# Patient Record
(demographics unavailable — no encounter records)

---

## 2025-02-12 NOTE — PHYSICAL EXAM
[de-identified] : Hand/wrist/finger (right)  Inspection  Swelling: none  Ecchymosis: none  Scissoring: none    Palpation  Tenderness: Moderate Location: Snuffbox, dorsum of the wrist  Wrist Flexion  Normal.  Wrist Extension  Normal.  Wrist Radial Deviation  Normal.  Wrist Ulnar Deviation  Normal.   Wrist/Pinch/ Motor Strength  Wrist Extension: 4/5, painful Wrist Flexion: 5/5  : 4/5   Finger Flexion  Normal.  Painful with first digit extension Finger Extension  Normal.  Sensory index  Normal   Special Tests   Finkelsteins: normal  Tinnels: normal  Phalens: normal  Pinch : normal  [de-identified] : ACC: [id number] EXAM: XR HAND MIN 3 VIEWS RT ORDERED BY: LEANN KAMARA ACC: [id number] EXAM: XR WRIST COMP MIN 3 VIEWS RT ORDERED BY: LEANN KAMARA ACC: [id number] EXAM: XR FOREARM 2 VIEWS RT ORDERED BY: LEANN KAMARA  PROCEDURE DATE: 02/03/2025  INTERPRETATION: XR FOREARM 2 VIEWS RIGHT, XR WRIST COMPLETE 3 VIEWS RIGHT, XR HAND 3 VIEWS RIGHT  INDICATION: r/o fx.  TECHNIQUE: AP, oblique and lateral views of the right hand and right wrist and AP and lateral views of the right forearm were obtained.  FINDINGS: There is no evidence of fracture or dislocation. The carpal bones are normally aligned. There is no soft tissue abnormality.  IMPRESSION: No fracture of the right hand, right wrist or right forearm  These images were personally reviewed with original findings documented as above.

## 2025-02-12 NOTE — DISCUSSION/SUMMARY
[de-identified] : LISSY HODGES is a 49 year old Right-hand-dominant female presenting with Fall on outstretched hand of the right hand resulting in possible occult scaphoid fracture versus other internal derangement.  Initial x-rays negative.  Plan: 1.  Thumb spica splint prescribed in office 2.  Diclofenac 75 mg prescribed 3.  MRI of right wrist to evaluate for occult scaphoid fracture or other internal derangement ordered 4.  Patient will remain out of work for the next 1 to 2 weeks until follow-up after MRI results and update on her status will be provided.

## 2025-02-12 NOTE — HISTORY OF PRESENT ILLNESS
[de-identified] : LISSY HODGES is a 49 year old female presenting with Acute right sided wrist pain status post mechanical fall on outstretched hand.  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  She is right-hand dominant.  Afterwards patient noted some moderate to significant pain when she went to try and use the hand on the mouse at work.  She then went to get this evaluated and had x-rays done that were negative for any fracture and was given a neutral wrist brace which has not been very helpful.  Has been taking Motrin at night which is not very helpful either.  Pain is most significant at the base of the thumb and across the back of the wrist and is here today for further evaluation.

## 2025-02-26 NOTE — HISTORY OF PRESENT ILLNESS
[de-identified] : LISSY HODGES is a 49 year old female presenting with Acute right sided wrist pain status post mechanical fall on outstretched hand.  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  She is right-hand dominant.  Afterwards patient noted some moderate to significant pain when she went to try and use the hand on the mouse at work.  She then went to get this evaluated and had x-rays done that were negative for any fracture and was given a neutral wrist brace which has not been very helpful.  Has been taking Motrin at night which is not very helpful either.  Pain is most significant at the base of the thumb and across the back of the wrist and is here today for further evaluation.  Interval history: Patient has been using the thumb spica brace for 2 weeks since last visit.  States she is still having moderate discomfort and difficulty using the right hand mostly with pain at the base of the thumb.  Here today to discuss MRI results.

## 2025-02-26 NOTE — PHYSICAL EXAM
[de-identified] : Hand/wrist/finger (right)  Inspection  Swelling: none  Ecchymosis: none  Scissoring: none    Palpation  Tenderness: Moderate Location: Base of the first digit at the MCP and CMC joint as well as over the first dorsal compartment.  Wrist Flexion  Normal.  Wrist Extension  Normal.  Wrist Radial Deviation  Normal.  Wrist Ulnar Deviation  Normal.   Wrist/Pinch/ Motor Strength  Wrist Extension: 4/5, painful Wrist Flexion: 5/5  : 4/5   Finger Flexion  Normal.  Painful with first digit extension Finger Extension  Normal.  Sensory index  Normal   Special Tests   Finkelsteins: Positive Tinnels: normal  Phalens: normal  Pinch : normal  [de-identified] : MRI of right wrist Date: 2/13/2025  Performed at [facility]: No, [place] [facility] Impression:   1.  Joint effusion 2.  No evidence of tendinopathy ligamentous injury or fracture.  These images were personally reviewed with original findings documented as above.

## 2025-02-26 NOTE — DISCUSSION/SUMMARY
[de-identified] : LSISY HODGES is a 49 year old Right-hand-dominant female presenting with Fall on outstretched hand of the right hand resulting in possible occult scaphoid fracture versus other internal derangement.  Initial x-rays negative.  Discussed MRI results with patient that were negative for any fracture or dislocation however did show a contusion and effusion for the wrist joint.  Discussed this will likely continue to improve over the next few weeks but she may be dealing with some pain and stiffness from being immobilized last 2 weeks.  Plan: 1.  Discontinue thumb spica brace 2.  Continue diclofenac 75 mg as needed.  Also discussed use of Voltaren gel. 3.  Referral given to occupational therapy 4.  Patient will remain out of work for 3 weeks as she rehabilitates with occupational therapy and will be cleared to return to work at that time.  If she is still having difficulty and recalcitrant pain at that time she will follow-up sooner otherwise she will follow-up in 6 to 8 weeks.

## 2025-03-31 NOTE — DISCUSSION/SUMMARY
[de-identified] : LISSY HODGES is a 49 year old Right-hand-dominant female presenting with Fall on outstretched hand of the right hand resulting in possible occult scaphoid fracture versus other internal derangement.  Initial x-rays negative.  Patient continuing to have moderate to significant pain and dysfunction with the right hand and thumb.  Plan: 1.  Patient will continue occupational therapy twice a week for the next 4 weeks 2.  She will use thumb and wrist bracing as prescribed then fashioned from occupational therapy 3.  She will continue diclofenac orally and topically as needed 4.  Patient will follow-up in 1 month for reevaluation and likely return to work at that time.  Discussed can consider radiocarpal versus first dorsal compartment CSI in the future if pain continues to not improve.

## 2025-03-31 NOTE — HISTORY OF PRESENT ILLNESS
[de-identified] : LISSY HODGES is a 49 year old female presenting with Acute right sided wrist pain status post mechanical fall on outstretched hand.  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  She is right-hand dominant.  Afterwards patient noted some moderate to significant pain when she went to try and use the hand on the mouse at work.  She then went to get this evaluated and had x-rays done that were negative for any fracture and was given a neutral wrist brace which has not been very helpful.  Has been taking Motrin at night which is not very helpful either.  Pain is most significant at the base of the thumb and across the back of the wrist and is here today for further evaluation.  Interval history: Just started occupational therapy and has done her initial evaluation and 1 session and is felt that it will be helpful.  She was fitted for multiple thumb spica and wrist braces of varying degrees of restriction and has been using those with relief.  Still having moderate to significant pain without the brace and believes she is still unable to return to work given the significant pain with any repetitive movements of the right hand and thumb.

## 2025-03-31 NOTE — RETURN TO WORK/SCHOOL
[___ Months] : I will re-evaluate the patient in [unfilled] month(s), at which time I will provide an update to their current status [Work] : work [FreeTextEntry2] : John Sotelo MD, FAAPMR, CAQSM   Sports Medicine Physiatrist [facility]

## 2025-03-31 NOTE — PHYSICAL EXAM
[de-identified] : Hand/wrist/finger (right)  Inspection  Swelling: none  Ecchymosis: none  Scissoring: none    Palpation  Tenderness: Moderate Location: Base of the first digit at the MCP and CMC joint as well as over the first dorsal compartment.  Wrist Flexion  Normal.  Wrist Extension  Normal.  Wrist Radial Deviation  Normal.  Wrist Ulnar Deviation  Normal.   Wrist/Pinch/ Motor Strength  Wrist Extension: 4/5, painful Wrist Flexion: 5/5  : 4/5   Finger Flexion  Normal.  Painful with first digit extension Finger Extension  Normal.  Sensory index  Normal   Special Tests   Finkelsteins: Positive Tinnels: normal  Phalens: normal  Pinch : normal  [de-identified] : MRI of right wrist Date: 2/13/2025  Performed at [facility]: No, [place] [facility] Impression:   1.  Joint effusion 2.  No evidence of tendinopathy ligamentous injury or fracture.  These images were personally reviewed with original findings documented as above.

## 2025-05-01 NOTE — PHYSICAL EXAM
[de-identified] : Hand/wrist/finger (right)  Inspection  Swelling: none  Ecchymosis: none  Scissoring: none    Palpation  Tenderness: Mild Location: Base of the first digit at the MCP and CMC joint as well as over the first dorsal compartment.  Wrist Flexion  Normal.  Wrist Extension  Normal.  Wrist Radial Deviation  Normal.  Wrist Ulnar Deviation  Normal.   Wrist/Pinch/ Motor Strength  Wrist Extension: 5-/5 Wrist Flexion: 5/5  : 5-/5   Finger Flexion  Normal.  Normal Finger Extension  Normal.  Sensory index  Normal   Special Tests   Finkelsteins: Mildly positive Tinnels: normal  Phalens: normal  Pinch : normal  [de-identified] : MRI of right wrist Date: 2/13/2025  Performed at [facility]: No, [place] [facility] Impression:   1.  Joint effusion 2.  No evidence of tendinopathy ligamentous injury or fracture.  These images were personally reviewed with original findings documented as above.

## 2025-05-01 NOTE — DISCUSSION/SUMMARY
[de-identified] : LISSY HODGES is a 49 year old Right-hand-dominant female presenting with Fall on outstretched hand of the right hand resulting in possible occult scaphoid fracture versus other internal derangement.  Initial x-rays negative.  Patient with significant improvement in wrist and thumb pain after 2 months of hand therapy.  Still with some mild pain and dysfunction.  Plan: 1.  Patient will complete her last 4 sessions over the next 2 weeks with hand therapy 2.  Patient will continue use of thumb brace that was fashioned by OT for the next 2 to 4 weeks with plan to wean off of this as tolerated 3.  She will continue diclofenac medicine topically and orally as needed 4.  Patient cleared to return to work as of Monday 5/5.  May continue to use hand splints for the next 2 weeks with work. 5.  Patient will follow-up as needed

## 2025-05-01 NOTE — HISTORY OF PRESENT ILLNESS
[de-identified] : LISSY HODGES is a 49 year old female presenting with Acute right sided wrist pain status post mechanical fall on outstretched hand.  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  She is right-hand dominant.  Afterwards patient noted some moderate to significant pain when she went to try and use the hand on the mouse at work.  She then went to get this evaluated and had x-rays done that were negative for any fracture and was given a neutral wrist brace which has not been very helpful.  Has been taking Motrin at night which is not very helpful either.  Pain is most significant at the base of the thumb and across the back of the wrist and is here today for further evaluation.  Interval history: Patient has now completed approximately 8 weeks of hand therapy and states it has been significantly helpful.  She continues to use prefabricated thumb splint with activity that is also very helpful.  Feels she is close to back to 100% still with some mild pain and limitations.